# Patient Record
(demographics unavailable — no encounter records)

---

## 2018-11-30 NOTE — US
EXAMINATION TYPE: US abdomen complete

 

DATE OF EXAM: 11/30/2018

 

COMPARISON: CT

 

CLINICAL HISTORY: R10.9 ABD Pain. Pt states generalized ABD pain and "burning" in bilateral flanks

 

EXAM MEASUREMENTS:

 

Liver Length:  18.8 cm   

Gallbladder Wall:  0.2 cm   

CBD:  0.4 cm

Spleen:  10.5 cm   

Right Kidney:  12.3 x 5.7 x 5.3 cm 

Left Kidney:  12.2 x 6.3 x 5.8 cm   

 

**Large pt body habitus**

 

Pancreas:  Head wnl, body and tail obscured by overlying bowel gas

Liver:  Grossly heterogeneous with hypoechoic lesion left lobe= 3.6 x 2.5 x 3.5 cm/ Enlarged, difficu
lt to penetrate   

Gallbladder:  wnl

**Evidence for sonographic Gatica's sign:  no

CBD:  wnl 

Spleen:  wnl   

Right Kidney:  wnl   

Left Kidney:  wnl   

Upper IVC:  wnl  

Abd Aorta:  wnl

 

 

 

 The intrahepatic portion of the IVC and proximal abdominal aorta are within normal limits.  There is
 no evidence of cholelithiasis.  Common bile duct is unremarkable.  The visualized portions of the pa
ncreas are homogenous.  The spleen is unremarkable.  Kidneys are symmetric and free of hydronephrosis
.  No renal lesions are seen.

 

IMPRESSION:

1. Probable hepatic fatty infiltration versus diffuse hepatocellular disease. Hypoechoic lesion left 
hepatic lobe measuring 30.6 x 2.5 x 3.5 cm. CT correlation recommended.

## 2022-04-28 NOTE — US
EXAMINATION TYPE: US gallbladder

 

DATE OF EXAM: 4/28/2022

 

COMPARISON: CT abdomen and pelvis 2017

 

CLINICAL HISTORY: R10.9 Abd pain. Abdomen pain x 4 days, occasional nausea

 

EXAM MEASUREMENTS:

 

Liver Length:  19.3 cm   

Gallbladder Wall:  0.3 cm   

CBD:  0.4 cm

Right Kidney:  11.3 cm

 

**Difficult and limited study due to patient body habitus

 

Pancreas:  obscured by overlying midline bowel gas

Liver:  enlarged, attenuating, heterogeneous, 2.2cm hypoechoic lesion left lobe  

Gallbladder:  limited visualization, appears contracted

**Evidence for sonographic Gatica's sign:  no

CBD:  wnl 

Right Kidney:  wnl 

 

**Scanned right lower quadrant at patient's area of pain: 3.8cm right ovarian cyst seen 

 

Suboptimal evaluation of pancreas on initial images. Visualized liver remains heterogeneously hyperec
hoic limiting evaluation for focal masses. Technologist marks some nonspecific small hypoechoic areas
. No right-sided hydronephrosis. Somewhat contracted gallbladder without shadowing mobile gallstones.
 Scanning at patient's right lower quadrant and area of pain there is right ovary with 3.8 x 3.0 cm r
ound hypoechoic to anechoic lesion with increased through transmission felt to reflect simple small o
varian cyst. Color imaging not performed.

 

IMPRESSION: Suspect 3.8 cm thin-walled cyst right ovary near site of pain right lower quadrant/pelvis
. Mild hepatomegaly with diffuse fatty infiltration of liver redemonstrated. No shadowing mobile gall
stones or ultrasound evidence for acute cholecystitis.